# Patient Record
(demographics unavailable — no encounter records)

---

## 2024-10-28 NOTE — HISTORY OF PRESENT ILLNESS
[FreeTextEntry1] : JO ANN ROBERTS 27 year, G0, LMP: 10/28/24, presents for an annual GYN exam.   Pt reports that she had a UTI s/p abx last week after intercourse with a new partner. UTI symptoms improved. Pt reports thick, chunky vaginal discharge x 1 d.  She denies abdominal and pelvic pain. No urinary complaints. BM is normal per patient.  Obhx: G0  GYNhx: denies PMH: denies PSH: denies Med: Microgestin Fe All: NKDA Soc:Denies Psych: PHQ 0 Famhx:denies   [PapSmeardate] : 10/2023 [TextBox_31] : NICK

## 2024-10-28 NOTE — PHYSICAL EXAM
[Appropriately responsive] : appropriately responsive [Alert] : alert [No Acute Distress] : no acute distress [Soft] : soft [Non-tender] : non-tender [Non-distended] : non-distended [No HSM] : No HSM [No Lesions] : no lesions [No Mass] : no mass [Oriented x3] : oriented x3 [Examination Of The Breasts] : a normal appearance [No Masses] : no breast masses were palpable [Labia Majora] : normal [Labia Minora] : normal [Discharge] : a  ~M vaginal discharge was present [Heavy] : heavy [Green] : green [Curds] : curd-like [Normal] : normal [Uterine Adnexae] : normal

## 2024-10-28 NOTE — PLAN
[FreeTextEntry1] : #HCM -Breast self exam reviewed and taught -STI Screening today with GC/CT cultures, HIV, RPR, Hep B/C -Pap today -Immunizations: UTD  #vaginitis likely yeast infection vaginitis panel collected diflucan x 2 rx  #contraception rx microgestin refilled  RTO in 1 year for annual GYN exam or PRN for any GYN complaints ZULEMA Mansfield MD